# Patient Record
Sex: MALE | Race: WHITE | NOT HISPANIC OR LATINO | Employment: FULL TIME | URBAN - METROPOLITAN AREA
[De-identification: names, ages, dates, MRNs, and addresses within clinical notes are randomized per-mention and may not be internally consistent; named-entity substitution may affect disease eponyms.]

---

## 2017-04-05 ENCOUNTER — OFFICE VISIT - HEALTHEAST (OUTPATIENT)
Dept: INTERNAL MEDICINE | Facility: CLINIC | Age: 32
End: 2017-04-05

## 2017-04-05 ENCOUNTER — COMMUNICATION - HEALTHEAST (OUTPATIENT)
Dept: TELEHEALTH | Facility: CLINIC | Age: 32
End: 2017-04-05

## 2017-04-05 DIAGNOSIS — F34.1 DYSTHYMIA: ICD-10-CM

## 2017-04-05 DIAGNOSIS — M25.811 SHOULDER IMPINGEMENT, RIGHT: ICD-10-CM

## 2017-04-05 DIAGNOSIS — Z13.220 SCREENING FOR HYPERLIPIDEMIA: ICD-10-CM

## 2017-04-05 DIAGNOSIS — E66.9 OBESITY: ICD-10-CM

## 2017-04-05 DIAGNOSIS — Z00.00 ROUTINE GENERAL MEDICAL EXAMINATION AT A HEALTH CARE FACILITY: ICD-10-CM

## 2017-04-05 DIAGNOSIS — T78.40XA ALLERGY, INITIAL ENCOUNTER: ICD-10-CM

## 2017-04-05 DIAGNOSIS — Z13.1 SCREENING FOR DIABETES MELLITUS: ICD-10-CM

## 2017-04-05 DIAGNOSIS — Z30.2 ENCOUNTER FOR STERILIZATION: ICD-10-CM

## 2017-04-05 DIAGNOSIS — M25.562 LEFT KNEE PAIN: ICD-10-CM

## 2017-04-05 LAB
CHOLEST SERPL-MCNC: 220 MG/DL
FASTING STATUS PATIENT QL REPORTED: YES
HBA1C MFR BLD: 5.6 % (ref 3.5–6)
HDLC SERPL-MCNC: 34 MG/DL
LDLC SERPL CALC-MCNC: 152 MG/DL
TRIGL SERPL-MCNC: 168 MG/DL

## 2017-04-05 ASSESSMENT — MIFFLIN-ST. JEOR: SCORE: 2446.06

## 2017-04-07 ENCOUNTER — COMMUNICATION - HEALTHEAST (OUTPATIENT)
Dept: INTERNAL MEDICINE | Facility: CLINIC | Age: 32
End: 2017-04-07

## 2017-04-10 ENCOUNTER — OFFICE VISIT - HEALTHEAST (OUTPATIENT)
Dept: PHYSICAL THERAPY | Facility: REHABILITATION | Age: 32
End: 2017-04-10

## 2017-04-10 ENCOUNTER — OFFICE VISIT - HEALTHEAST (OUTPATIENT)
Dept: ALLERGY | Facility: CLINIC | Age: 32
End: 2017-04-10

## 2017-04-10 DIAGNOSIS — G89.29 CHRONIC PAIN OF LEFT KNEE: ICD-10-CM

## 2017-04-10 DIAGNOSIS — M25.811 SHOULDER IMPINGEMENT, RIGHT: ICD-10-CM

## 2017-04-10 DIAGNOSIS — M62.81 GENERALIZED MUSCLE WEAKNESS: ICD-10-CM

## 2017-04-10 DIAGNOSIS — M25.511 ACUTE PAIN OF RIGHT SHOULDER: ICD-10-CM

## 2017-04-10 DIAGNOSIS — M25.611 SHOULDER STIFFNESS, RIGHT: ICD-10-CM

## 2017-04-10 DIAGNOSIS — M25.562 CHRONIC PAIN OF LEFT KNEE: ICD-10-CM

## 2017-04-10 DIAGNOSIS — K20.0 EOSINOPHILIC ESOPHAGITIS: ICD-10-CM

## 2017-04-10 DIAGNOSIS — Z91.018 FOOD ALLERGY: ICD-10-CM

## 2017-04-19 ENCOUNTER — OFFICE VISIT - HEALTHEAST (OUTPATIENT)
Dept: INTERNAL MEDICINE | Facility: CLINIC | Age: 32
End: 2017-04-19

## 2017-04-19 DIAGNOSIS — R74.01 TRANSAMINITIS: ICD-10-CM

## 2017-04-19 DIAGNOSIS — Z88.9 MULTIPLE ALLERGIES: ICD-10-CM

## 2017-04-19 DIAGNOSIS — E55.9 VITAMIN D DEFICIENCY: ICD-10-CM

## 2017-04-19 DIAGNOSIS — F41.9 ANXIETY: ICD-10-CM

## 2017-04-19 ASSESSMENT — MIFFLIN-ST. JEOR: SCORE: 2440.39

## 2017-05-01 ENCOUNTER — OFFICE VISIT - HEALTHEAST (OUTPATIENT)
Dept: BEHAVIORAL HEALTH | Facility: CLINIC | Age: 32
End: 2017-05-01

## 2017-05-01 DIAGNOSIS — F33.1 MODERATE EPISODE OF RECURRENT MAJOR DEPRESSIVE DISORDER (H): ICD-10-CM

## 2017-05-10 ENCOUNTER — COMMUNICATION - HEALTHEAST (OUTPATIENT)
Dept: INTERNAL MEDICINE | Facility: CLINIC | Age: 32
End: 2017-05-10

## 2017-05-15 ENCOUNTER — OFFICE VISIT - HEALTHEAST (OUTPATIENT)
Dept: SURGERY | Facility: CLINIC | Age: 32
End: 2017-05-15

## 2017-05-15 DIAGNOSIS — F32.A DEPRESSION: ICD-10-CM

## 2017-05-15 DIAGNOSIS — Z87.898 HISTORY OF SNORING: ICD-10-CM

## 2017-05-15 DIAGNOSIS — R53.83 FATIGUE: ICD-10-CM

## 2017-05-15 DIAGNOSIS — Z87.442 PERSONAL HISTORY OF KIDNEY STONES: ICD-10-CM

## 2017-05-15 DIAGNOSIS — E66.01 OBESITY, CLASS III, BMI 40-49.9 (MORBID OBESITY) (H): ICD-10-CM

## 2017-05-15 ASSESSMENT — MIFFLIN-ST. JEOR: SCORE: 2409.77

## 2017-05-16 ENCOUNTER — AMBULATORY - HEALTHEAST (OUTPATIENT)
Dept: BEHAVIORAL HEALTH | Facility: CLINIC | Age: 32
End: 2017-05-16

## 2017-05-16 ENCOUNTER — OFFICE VISIT - HEALTHEAST (OUTPATIENT)
Dept: BEHAVIORAL HEALTH | Facility: CLINIC | Age: 32
End: 2017-05-16

## 2017-05-16 DIAGNOSIS — F33.1 MAJOR DEPRESSIVE DISORDER, RECURRENT EPISODE, MODERATE (H): ICD-10-CM

## 2017-05-16 DIAGNOSIS — F43.21 GRIEF: ICD-10-CM

## 2017-05-24 ENCOUNTER — AMBULATORY - HEALTHEAST (OUTPATIENT)
Dept: SLEEP MEDICINE | Facility: CLINIC | Age: 32
End: 2017-05-24

## 2017-05-25 ENCOUNTER — OFFICE VISIT - HEALTHEAST (OUTPATIENT)
Dept: BEHAVIORAL HEALTH | Facility: CLINIC | Age: 32
End: 2017-05-25

## 2017-05-25 DIAGNOSIS — F33.1 MAJOR DEPRESSIVE DISORDER, RECURRENT EPISODE, MODERATE (H): ICD-10-CM

## 2017-05-26 ENCOUNTER — OFFICE VISIT - HEALTHEAST (OUTPATIENT)
Dept: BEHAVIORAL HEALTH | Facility: CLINIC | Age: 32
End: 2017-05-26

## 2017-05-26 DIAGNOSIS — F33.1 MDD (MAJOR DEPRESSIVE DISORDER), RECURRENT EPISODE, MODERATE (H): ICD-10-CM

## 2017-05-26 DIAGNOSIS — G47.00 INSOMNIA, UNSPECIFIED: ICD-10-CM

## 2017-05-26 ASSESSMENT — MIFFLIN-ST. JEOR: SCORE: 2414.31

## 2017-06-19 ENCOUNTER — OFFICE VISIT - HEALTHEAST (OUTPATIENT)
Dept: BEHAVIORAL HEALTH | Facility: CLINIC | Age: 32
End: 2017-06-19

## 2017-06-19 DIAGNOSIS — F33.1 MAJOR DEPRESSIVE DISORDER, RECURRENT EPISODE, MODERATE (H): ICD-10-CM

## 2017-06-22 ENCOUNTER — RECORDS - HEALTHEAST (OUTPATIENT)
Dept: ADMINISTRATIVE | Facility: OTHER | Age: 32
End: 2017-06-22

## 2017-06-22 ENCOUNTER — RECORDS - HEALTHEAST (OUTPATIENT)
Dept: SLEEP MEDICINE | Facility: CLINIC | Age: 32
End: 2017-06-22

## 2017-06-22 DIAGNOSIS — E66.01 MORBID (SEVERE) OBESITY DUE TO EXCESS CALORIES (H): ICD-10-CM

## 2017-06-22 DIAGNOSIS — Z87.898 PERSONAL HISTORY OF OTHER SPECIFIED CONDITIONS: ICD-10-CM

## 2017-06-22 DIAGNOSIS — R53.83 OTHER FATIGUE: ICD-10-CM

## 2017-06-23 ENCOUNTER — OFFICE VISIT - HEALTHEAST (OUTPATIENT)
Dept: SURGERY | Facility: CLINIC | Age: 32
End: 2017-06-23

## 2017-06-23 DIAGNOSIS — E66.01 OBESITY, MORBID, BMI 40.0-49.9 (H): ICD-10-CM

## 2017-06-23 DIAGNOSIS — Z71.3 NUTRITIONAL COUNSELING: ICD-10-CM

## 2017-06-23 ASSESSMENT — MIFFLIN-ST. JEOR: SCORE: 2412.04

## 2017-06-27 ENCOUNTER — COMMUNICATION - HEALTHEAST (OUTPATIENT)
Dept: SLEEP MEDICINE | Facility: CLINIC | Age: 32
End: 2017-06-27

## 2017-06-27 ENCOUNTER — COMMUNICATION - HEALTHEAST (OUTPATIENT)
Dept: SURGERY | Facility: CLINIC | Age: 32
End: 2017-06-27

## 2017-07-06 ENCOUNTER — COMMUNICATION - HEALTHEAST (OUTPATIENT)
Dept: ALLERGY | Facility: CLINIC | Age: 32
End: 2017-07-06

## 2017-07-10 ENCOUNTER — COMMUNICATION - HEALTHEAST (OUTPATIENT)
Dept: BEHAVIORAL HEALTH | Facility: CLINIC | Age: 32
End: 2017-07-10

## 2017-08-16 ENCOUNTER — OFFICE VISIT - HEALTHEAST (OUTPATIENT)
Dept: SLEEP MEDICINE | Facility: CLINIC | Age: 32
End: 2017-08-16

## 2017-08-16 DIAGNOSIS — G47.33 OSA (OBSTRUCTIVE SLEEP APNEA): ICD-10-CM

## 2017-08-16 ASSESSMENT — MIFFLIN-ST. JEOR: SCORE: 2382.56

## 2017-08-25 ENCOUNTER — AMBULATORY - HEALTHEAST (OUTPATIENT)
Dept: SLEEP MEDICINE | Facility: CLINIC | Age: 32
End: 2017-08-25

## 2018-01-05 ENCOUNTER — OFFICE VISIT - HEALTHEAST (OUTPATIENT)
Dept: INTERNAL MEDICINE | Facility: CLINIC | Age: 33
End: 2018-01-05

## 2018-01-05 DIAGNOSIS — G47.33 OSA (OBSTRUCTIVE SLEEP APNEA): ICD-10-CM

## 2018-01-05 DIAGNOSIS — E66.01 OBESITY, CLASS III, BMI 40-49.9 (MORBID OBESITY) (H): ICD-10-CM

## 2018-01-05 DIAGNOSIS — F33.1 MAJOR DEPRESSIVE DISORDER, RECURRENT EPISODE, MODERATE (H): ICD-10-CM

## 2018-01-05 DIAGNOSIS — J11.1 INFLUENZA-LIKE ILLNESS: ICD-10-CM

## 2018-01-05 ASSESSMENT — MIFFLIN-ST. JEOR: SCORE: 2364.41

## 2018-03-12 ENCOUNTER — OFFICE VISIT - HEALTHEAST (OUTPATIENT)
Dept: INTERNAL MEDICINE | Facility: CLINIC | Age: 33
End: 2018-03-12

## 2018-03-12 DIAGNOSIS — J06.9 UPPER RESPIRATORY INFECTION: ICD-10-CM

## 2018-03-12 ASSESSMENT — MIFFLIN-ST. JEOR: SCORE: 2378.02

## 2018-08-15 ENCOUNTER — COMMUNICATION - HEALTHEAST (OUTPATIENT)
Dept: BEHAVIORAL HEALTH | Facility: CLINIC | Age: 33
End: 2018-08-15

## 2018-08-15 DIAGNOSIS — F33.1 MDD (MAJOR DEPRESSIVE DISORDER), RECURRENT EPISODE, MODERATE (H): ICD-10-CM

## 2019-05-03 ENCOUNTER — OFFICE VISIT - HEALTHEAST (OUTPATIENT)
Dept: INTERNAL MEDICINE | Facility: CLINIC | Age: 34
End: 2019-05-03

## 2019-05-03 DIAGNOSIS — Z91.09 ENVIRONMENTAL ALLERGIES: ICD-10-CM

## 2019-05-03 DIAGNOSIS — M21.42 FLAT FEET: ICD-10-CM

## 2019-05-03 DIAGNOSIS — E66.01 OBESITY, CLASS III, BMI 40-49.9 (MORBID OBESITY) (H): ICD-10-CM

## 2019-05-03 DIAGNOSIS — M21.41 FLAT FEET: ICD-10-CM

## 2019-05-03 DIAGNOSIS — K20.0 EOSINOPHILIC ESOPHAGITIS: ICD-10-CM

## 2019-05-03 DIAGNOSIS — F33.1 MDD (MAJOR DEPRESSIVE DISORDER), RECURRENT EPISODE, MODERATE (H): ICD-10-CM

## 2019-05-03 ASSESSMENT — MIFFLIN-ST. JEOR: SCORE: 2432.45

## 2019-05-07 ENCOUNTER — RECORDS - HEALTHEAST (OUTPATIENT)
Dept: ADMINISTRATIVE | Facility: OTHER | Age: 34
End: 2019-05-07

## 2019-05-16 ENCOUNTER — OFFICE VISIT - HEALTHEAST (OUTPATIENT)
Dept: PODIATRY | Facility: CLINIC | Age: 34
End: 2019-05-16

## 2019-05-16 DIAGNOSIS — M21.6X2 PRONATION DEFORMITY OF BOTH FEET: ICD-10-CM

## 2019-05-16 DIAGNOSIS — M62.40 CONTRACTED, TENDON: ICD-10-CM

## 2019-05-16 DIAGNOSIS — M21.6X1 PRONATION DEFORMITY OF BOTH FEET: ICD-10-CM

## 2019-05-16 ASSESSMENT — MIFFLIN-ST. JEOR: SCORE: 2432.45

## 2019-05-23 ENCOUNTER — RECORDS - HEALTHEAST (OUTPATIENT)
Dept: ADMINISTRATIVE | Facility: OTHER | Age: 34
End: 2019-05-23

## 2019-05-23 ENCOUNTER — AMBULATORY - HEALTHEAST (OUTPATIENT)
Dept: OTHER | Facility: CLINIC | Age: 34
End: 2019-05-23

## 2019-05-29 ENCOUNTER — RECORDS - HEALTHEAST (OUTPATIENT)
Dept: ADMINISTRATIVE | Facility: OTHER | Age: 34
End: 2019-05-29

## 2019-06-06 ENCOUNTER — AMBULATORY - HEALTHEAST (OUTPATIENT)
Dept: OTHER | Facility: CLINIC | Age: 34
End: 2019-06-06

## 2019-06-10 ENCOUNTER — COMMUNICATION - HEALTHEAST (OUTPATIENT)
Dept: SCHEDULING | Facility: CLINIC | Age: 34
End: 2019-06-10

## 2019-06-28 ENCOUNTER — RECORDS - HEALTHEAST (OUTPATIENT)
Dept: ADMINISTRATIVE | Facility: OTHER | Age: 34
End: 2019-06-28

## 2019-07-01 ENCOUNTER — RECORDS - HEALTHEAST (OUTPATIENT)
Dept: ADMINISTRATIVE | Facility: OTHER | Age: 34
End: 2019-07-01

## 2019-08-06 ENCOUNTER — RECORDS - HEALTHEAST (OUTPATIENT)
Dept: ADMINISTRATIVE | Facility: OTHER | Age: 34
End: 2019-08-06

## 2019-08-30 ENCOUNTER — RECORDS - HEALTHEAST (OUTPATIENT)
Dept: ADMINISTRATIVE | Facility: OTHER | Age: 34
End: 2019-08-30

## 2019-09-16 ENCOUNTER — RECORDS - HEALTHEAST (OUTPATIENT)
Dept: ADMINISTRATIVE | Facility: OTHER | Age: 34
End: 2019-09-16

## 2019-10-04 ENCOUNTER — HEALTH MAINTENANCE LETTER (OUTPATIENT)
Age: 34
End: 2019-10-04

## 2019-11-18 ENCOUNTER — RECORDS - HEALTHEAST (OUTPATIENT)
Dept: ADMINISTRATIVE | Facility: OTHER | Age: 34
End: 2019-11-18

## 2019-11-21 ENCOUNTER — RECORDS - HEALTHEAST (OUTPATIENT)
Dept: ADMINISTRATIVE | Facility: OTHER | Age: 34
End: 2019-11-21

## 2020-05-14 ENCOUNTER — COMMUNICATION - HEALTHEAST (OUTPATIENT)
Dept: INTERNAL MEDICINE | Facility: CLINIC | Age: 35
End: 2020-05-14

## 2020-05-14 DIAGNOSIS — F33.1 MDD (MAJOR DEPRESSIVE DISORDER), RECURRENT EPISODE, MODERATE (H): ICD-10-CM

## 2020-11-08 ENCOUNTER — HEALTH MAINTENANCE LETTER (OUTPATIENT)
Age: 35
End: 2020-11-08

## 2021-03-17 ENCOUNTER — COMMUNICATION - HEALTHEAST (OUTPATIENT)
Dept: INTERNAL MEDICINE | Facility: CLINIC | Age: 36
End: 2021-03-17

## 2021-03-17 DIAGNOSIS — F33.1 MDD (MAJOR DEPRESSIVE DISORDER), RECURRENT EPISODE, MODERATE (H): ICD-10-CM

## 2021-05-28 NOTE — PATIENT INSTRUCTIONS - HE
Please call one of the [place] locations below to schedule an appointment. If you received a prescription please bring it with you to your appointment. Some locations are limited to what they carry.    Office Locations    [facility]  [address] [place] [address]  Home Medical Equipment, [address]   Phone: [phone]   Orthotics and Prosthetics, [address]   Phone: [phone]    [facility]  Home Medical Equipment  [address], [address], [place] [address]   Phone: [phone]    Orthotics and Prosthetics ([facility])    [address], [address], [place] [address]  Phone: [phone]    [facility]  [address]  [address]   [place] [address]   Phone: [phone]    [facility]  [address]  [place] [address]  Phone: [phone]    [facility]   [address]  [place] [address]  Phone: [phone]    [facility]  [address]  [place] [address]  Phone: [phone]    [facility]  [address]  [address]  [place] [address]  Phone: [phone]    [place]   [address] [place] [address]   Phone: [phone]

## 2021-05-28 NOTE — PROGRESS NOTES
FOOT AND ANKLE SURGERY/PODIATRY CONSULT NOTE         ASSESSMENT:   Pronation deformity bilateral feet  Contracted Achilles tendon bilaterally      TREATMENT:  I have recommended new orthotics        HPI: I was asked to see Ronnie CRUZ GarnicaHancock today to evaluate and treat bilateral foot pain.  Patient stated that he has a history of wearing orthotics.  He has not had a new pair of orthotics in 4 years.  He stated he has tried to remain active.  He is finding it difficult to wear current orthotics while exercising because they do not give him the control he had initially.  Denies any trauma to his feet.  He has not had any redness or swelling.  Pain is mild to moderate.  It is relieved with nonweightbearing.  The patient was seen in consultation at the request of Simon Major MD for evaluation treatment painful flatfeet.     Past Medical History:   Diagnosis Date     Cholelithiasis      Depression     on bupropion, doing well now. therapy as well      Environmental allergies     Cat dander, dust mites, pine nuts, ragweed.     Eosinophilic esophagitis     EGD in 2015     Kidney stones        Past Surgical History:   Procedure Laterality Date     CHOLECYSTECTOMY       CYSTOSCOPY W/ URETEROSCOPY W/ LITHOTRIPSY Left        No Known Allergies      Current Outpatient Medications:      buPROPion (WELLBUTRIN XL) 150 MG 24 hr tablet, TAKE 1 TABLET BY MOUTH EVERY MORNING, Disp: 90 tablet, Rfl: 3     fluticasone propionate (FLOVENT HFA) 220 mcg/actuation inhaler, 2 puffs swallowed twice daily, Disp: 1 Inhaler, Rfl: 11     loratadine (CLARITIN) 10 mg tablet, Take 1 tablet (10 mg total) by mouth daily., Disp: 90 tablet, Rfl: 3     omeprazole (PRILOSEC) 20 MG capsule, Take 1 capsule (20 mg total) by mouth daily., Disp: 90 capsule, Rfl: 3    Family History   Problem Relation Age of Onset     Colon cancer Mother 50     Obesity Mother      Arthritis Mother      Hyperlipidemia Mother      Sleep apnea Mother      Snoring Mother      Kidney  cancer Father      Cancer Father      No Medical Problems Sister      Cholesteatoma Brother      Sleep apnea Brother      Hyperlipidemia Maternal Uncle      Hypertension Maternal Uncle      Obesity Maternal Grandmother      Hyperlipidemia Maternal Grandmother      Hypertension Maternal Grandmother      Asthma Maternal Grandmother        Social History     Socioeconomic History     Marital status: Single     Spouse name: Not on file     Number of children: Not on file     Years of education: Not on file     Highest education level: Not on file   Occupational History     Not on file   Social Needs     Financial resource strain: Not on file     Food insecurity:     Worry: Not on file     Inability: Not on file     Transportation needs:     Medical: Not on file     Non-medical: Not on file   Tobacco Use     Smoking status: Passive Smoke Exposure - Never Smoker     Smokeless tobacco: Never Used   Substance and Sexual Activity     Alcohol use: No     Drug use: No     Sexual activity: Yes     Partners: Female   Lifestyle     Physical activity:     Days per week: Not on file     Minutes per session: Not on file     Stress: Not on file   Relationships     Social connections:     Talks on phone: Not on file     Gets together: Not on file     Attends [employer]al service: Not on file     Active member of club or organization: Not on file     Attends meetings of clubs or organizations: Not on file     Relationship status: Not on file     Intimate partner violence:     Fear of current or ex partner: Not on file     Emotionally abused: Not on file     Physically abused: Not on file     Forced sexual activity: Not on file   Other Topics Concern     Not on file   Social History Narrative    Lives with his Andie nava.  Works at [employer].  HS graduate some college.         Review of Systems - Patient denies fever, chills, rash, wound, stiffness, limping, numbness, weakness, heart burn, blood in stool, chest pain with  activity, calf pain when walking, shortness of breath with activity, chronic cough, easy bleeding/bruising, swelling of ankles, excessive thirst, fatigue, depression, anxiety.  Patient admits to bilateral foot pain.      OBJECTIVE:  Appearance: alert, well appearing, and in no distress.    Vitals:    05/16/19 1257   BP: 104/70   Pulse: 94   SpO2: 93%       BMI= Body mass index is 46.26 kg/m .    General appearance: Patient is alert and fully cooperative with history & exam.  No sign of distress is noted during the visit.  Psychiatric: Affect is pleasant & appropriate.  Patient appears motivated to improve health.  Respiratory: Breathing is regular & unlabored while sitting.  HEENT: Hearing is intact to spoken word.  Speech is clear.  No gross evidence of visual impairment that would impact ambulation.    Vascular: Dorsalis pedis and posterior tibial pulses are palpable. There is pedal hair growth bilaterally.  CFT < 3 sec from anterior tibial surface to distal digits bilaterally. There is no appreciable edema noted.  Dermatologic: Turgor and texture are within normal limits. No coloration or temperature changes. No primary or secondary lesions noted.  Neurologic: All epicritic and proprioceptive sensations are grossly intact bilaterally.  Musculoskeletal: All active and passive ankle, subtalar, midtarsal, and 1st MPJ range of motion are grossly intact without pain or crepitus, with the exception of none.  Is flattening of the medial longitudinal arch noted bilaterally upon weightbearing.  There is a significant decrease in the amount of dorsiflexion available at both ankle joints when both feet are maximally dorsiflexed with the legs are extended.  Manual muscle strength is +4/5 bilaterally in all compartments. All dorsiflexors, plantarflexors, invertors, evertors are intact bilaterally.  No tenderness present to bilateral feet or ankles on palpation.  No tenderness to bilateral feet or ankles with range of motion.  Calf is soft/non-tender without warmth/induration    Imaging:     No results found.       TREATMENT:  I have recommended orthotics.  The patient is to return to the clinic as needed.    Dejan Santos; JAMIE  [facility] Foot & Ankle Surgery/Podiatry

## 2021-05-29 NOTE — PROGRESS NOTES
"S: Pt. seen in the [place] office. Male 5'11\", 321 lbs. Dr. Santos. RX: Custom Foot Orthotics. DX: Pronation deformity of both feet, Contracted tendon.  O: Pt. is presently wearing 3/4 length FO that he has had for 4 years. No surgeries or injuries to date.  A: G: Pt. ambulates without assistance. ROM within norm. Arch pain 3/10. Pes Cavus feet. Hind foot valgus. Fore foot neutral. 1st ray flexible. Bilateral pinch callus's and callus formation bilaterally on 2 and 5 MTH's plantar surface. Today I C/M with bio foam for Custom [place] FO. FO to lift and support medial long. arches. Straighten ankle valgus. Met pads to offload MTH's. Due to weight and Pes Cavus feet Pt. will need custom.  P: Pt. to be seen for F/D.    Marcial LORA,LO  "

## 2021-05-29 NOTE — PROGRESS NOTES
"S: Pt. seen in the [place] office for F/D of [place] FO. Male 5'11\", 321 lbs. Dr. Santos. RX: Custom Foot Orthotics. DX: Pronation deformity of both feet, Contracted tendon.  O: Pt. is presently wearing 3/4 length FO that he has had for 4 years. No surgeries or injuries to date.  A: G: Pt. ambulates without assistance. ROM within norm. Arch pain 3/10. Pes Cavus feet. Hind foot valgus. Fore foot neutral. 1st ray flexible. Bilateral pinch callus's and callus formation bilaterally on 2 and 5 MTH's plantar surface. Today I F/D Custom [place] FO. FO lift and support medial long. arches. Straighten ankle valgus. Met pads offload MTH's. Due to weight and Pes Cavus feet Pt. will need custom. Overall fit is good. Donning doffing wear and care along with break in schedule given.   P: Pt. to be seen as needed..    Marcial LORA,LO  "

## 2021-05-29 NOTE — TELEPHONE ENCOUNTER
"RN Triage:   Patient is calling to set up an appointment for \"random chest pains on the left side\". Happens when he is up walking around and no chest pains at rest. Patient stated it feels like a \"hurt muscle\" to the left of his breast bone. Heart rate is normally at 90-95.   Patient stated that his chest pain does not radiate. Patient reported that his pain is made worse by walking and relieved by rest. 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 Patient was given the disposition of ED now. Patient agreed to plan and will go now.     Margarette Santos RN, BSN Care Connection Triage Nurse    Reason for Disposition    Chest pain lasting longer than 5 minutes    Intermittent chest pain and pain has been increasing in severity or frequency    Answer Assessment - Initial Assessment Questions  1. LOCATION: \"Where does it hurt?\"        Left of the breast bone.   2. RADIATION: \"Does the pain go anywhere else?\" (e.g., into neck, jaw, arms, back)      NO  3. ONSET: \"When did the chest pain begin?\" (Minutes, hours or days)       Started Friday late afternoon  4. PATTERN \"Does the pain come and go, or has it been constant since it started?\"  \"Does it get worse with exertion?\"       Comes and goes when he is walking  5. DURATION: \"How long does it last\" (e.g., seconds, minutes, hours)      The whole time he is up walking, he does not walk often.   6. SEVERITY: \"How bad is the pain?\"  (e.g., Scale 1-10; mild, moderate, or severe)    He needs to sit after he is up walking.   7. CARDIAC RISK FACTORS: \"Do you have any history of heart problems or risk factors for heart disease?\" (e.g., prior heart attack, angina; high blood pressure, diabetes, being overweight, high cholesterol, smoking, or strong family history of heart disease)     Overwieght  8. PULMONARY RISK FACTORS: \"Do you have any history of lung disease?\"  (e.g., blood clots in lung, asthma, emphysema, birth control pills)      NO  9. " "CAUSE: \"What do you think is causing the chest pain?\"      Unknown  10. OTHER SYMPTOMS: \"Do you have any other symptoms?\" (e.g., dizziness, nausea, vomiting, sweating, fever, difficulty breathing, cough)  NO    Protocols used: CHEST PAIN-A-OH      "

## 2021-05-30 VITALS — WEIGHT: 315 LBS | HEIGHT: 71 IN | BODY MASS INDEX: 44.1 KG/M2

## 2021-05-30 VITALS — BODY MASS INDEX: 44.1 KG/M2 | HEIGHT: 71 IN | WEIGHT: 315 LBS

## 2021-05-31 VITALS — WEIGHT: 312 LBS | BODY MASS INDEX: 43.68 KG/M2 | HEIGHT: 71 IN

## 2021-05-31 VITALS — WEIGHT: 315 LBS | BODY MASS INDEX: 44.1 KG/M2 | HEIGHT: 71 IN

## 2021-05-31 VITALS — BODY MASS INDEX: 44.1 KG/M2 | WEIGHT: 315 LBS | HEIGHT: 71 IN

## 2021-06-01 VITALS — WEIGHT: 315 LBS | BODY MASS INDEX: 44.1 KG/M2 | HEIGHT: 71 IN

## 2021-06-03 VITALS — WEIGHT: 315 LBS | BODY MASS INDEX: 44.1 KG/M2 | HEIGHT: 71 IN

## 2021-06-08 NOTE — TELEPHONE ENCOUNTER
RN cannot approve Refill Request    RN can NOT refill this medication PCP messaged that patient is overdue for Office Visit. Last office visit: 5/3/2019 Simon Major MD Last Physical: 4/5/2017 Last MTM visit: Visit date not found Last visit same specialty: 5/3/2019 Simon Major MD.  Next visit within 3 mo: Visit date not found  Next physical within 3 mo: Visit date not found      Tammy Haddad, [facility] Triage/Med Refill 5/14/2020    Requested Prescriptions   Pending Prescriptions Disp Refills     buPROPion (WELLBUTRIN XL) 150 MG 24 hr tablet [Pharmacy Med Name: BUPROPION HCL  MG TABLET] 90 tablet 3     Sig: TAKE ONE TABLET BY MOUTH ONCE DAILY EVERY MORNING       Tricyclics/Misc Antidepressant/Antianxiety Meds Refill Protocol Failed - 5/14/2020 12:36 AM        Failed - PCP or prescribing provider visit in last year     Last office visit with prescriber/PCP: 5/3/2019 Simon Major MD OR same dept: Visit date not found OR same specialty: 5/3/2019 Simon Major MD  Last physical: 4/5/2017 Last MTM visit: Visit date not found   Next visit within 3 mo: Visit date not found  Next physical within 3 mo: Visit date not found  Prescriber OR PCP: Simon Major MD  Last diagnosis associated with med order: 1. MDD (major depressive disorder), recurrent episode, moderate (H)  - buPROPion (WELLBUTRIN XL) 150 MG 24 hr tablet [Pharmacy Med Name: BUPROPION HCL  MG TABLET]; TAKE ONE TABLET BY MOUTH ONCE DAILY EVERY MORNING  Dispense: 90 tablet; Refill: 3    If protocol passes may refill for 12 months if within 3 months of last provider visit (or a total of 15 months).

## 2021-06-09 NOTE — PROGRESS NOTES
Office Visit - Physical   Ronnie Hancock   32 y.o.  male    Date of visit: 4/5/2017  Physician: Simon Major MD     Assessment and Plan   1. Routine general medical examination at a health care facility  This is a 32-year-old man who could benefit from weight loss.  We discussed at length.  We discussed the importance of reduction in calories.  We also discussed importance of fitness.  Immunizations are up-to-date.  He was given advanced directive paperwork.    2. Screening for hyperlipidemia  - Lipid Cascade    3. Screening for diabetes mellitus  - Glycosylated Hemoglobin A1c    4. Left knee pain  I think he has some patellar tendinopathy and I discussed quad strengthening and hamstring stretching with him  - Ambulatory referral to Physical Therapy    5. Shoulder impingement, right  Discussed Codman maneuvers  - Ambulatory referral to Physical Therapy    6. Encounter for sterilization  - Ambulatory referral to Urology    7. Dysthymia  Start Wellbutrin 150 mg daily, referral for therapy, follow-up in 2 weeks  - Ambulatory referral to Psychology    8. Allergy, initial encounter  - Ambulatory referral to Allergy    9. Obesity  - Ambulatory referral to Bariatric Care: Surgical and Non-Surgical  - HM2(CBC w/o Differential)  - Comprehensive Metabolic Panel  - Thyroid Cascade  - Vitamin D, Total (25-Hydroxy)      The following high BMI interventions were performed this visit: encouragement to exercise and lifestyle education regarding diet    Return in about 2 weeks (around 4/19/2017) for recheck.     Chief Complaint   Chief Complaint   Patient presents with     Annual Exam     New Pt Physical - Fasting for lab work - Left knee pain, stairs difficult, ROM decreased - Right tricep pain x months, NKI - Discuss weight issues - discuss allergy testing - Discuss Vasectomy        Patient Profile   Social History     Social History Narrative    Lives with his Andie nava.  Works at [employer].  HS  graduate some college.          Past Medical History   Patient Active Problem List   Diagnosis     Obesity       Past Surgical History  He has a past surgical history that includes Cholecystectomy and Cystoscopy w/ ureteroscopy w/ lithotripsy (Left).     History of Present Illness   This 32 y.o. old man comes in for annual physical, to establish care and for evaluation of numerous medical complaints.  His medical history was reviewed, the electronic medical record was updated and it is reflected in this note.  The main issue would like to talk about his obesity.  His wife is also obese.  They are both interested in losing weight.  He has tried exercise but since starting exercise he has had left knee pain and right shoulder pain.  This limits his amount of exercise.  He does not really watch his calories.  He is interested in nonsurgical weight loss and would like to meet with a specialist.  He has knee pain is worse with going from sitting to standing climbing stairs going down stairs and squatting.  It is worse with activity and improves with rest.  His shoulder hurts with overhead motion.  No known injury to either.  Additionally we had a long discussion today about low mood.  He has had depression in the past and has been on antidepressants and since therapist.  He thought bupropion worked well.  He is not sure why he stopped it.  He has little motivation to do things and gets little enjoyment out of things.  Denies thoughts of self-harm or harm to others.  He is engaged to be  and neither he nor his wife are interested in children.  She has an IUD in the use condoms.  He is interested in surgical contraception.  He has issues with what sounds like allergic rhinosinusitis and cough.  He has had some allergy testing it sounds like blood work and he would like to meet with an allergist about food allergies.  It does not sound like he has ever had anaphylaxis.    Review of Systems: A comprehensive review of  "systems was negative except as noted.     Medications and Allergies   Current Outpatient Prescriptions   Medication Sig Dispense Refill     fexofenadine (ALLEGRA) 60 MG tablet Take 60 mg by mouth as needed.       buPROPion (WELLBUTRIN XL) 150 MG 24 hr tablet Take 1 tablet (150 mg total) by mouth every morning. 30 tablet 2     No current facility-administered medications for this visit.      No Known Allergies     Family and Social History   Family History   Problem Relation Age of Onset     Colon cancer Mother 50     Kidney cancer Father      No Medical Problems Sister      Cholesteatoma Brother         Social History   Substance Use Topics     Smoking status: Never Smoker     Smokeless tobacco: None     Alcohol use No        Physical Exam   General Appearance:   No acute distress    /78 (Patient Site: Left Arm, Patient Position: Sitting)  Pulse 72  Ht 5' 10.5\" (1.791 m)  Wt (!) 330 lb (149.7 kg)  SpO2 98%  BMI 46.68 kg/m2    EYES: Eyelids, conjunctiva, and sclera were normal. Pupils were normal. Cornea, iris, and lens were normal bilaterally.  HEAD, EARS, NOSE, MOUTH, AND THROAT: Head and face were normal. Hearing was normal to voice and the ears were normal to external exam. Nose appearance was normal and there was no discharge. Oropharynx was normal.  NECK: Neck appearance was normal. There were no neck masses and the thyroid was not enlarged.  RESPIRATORY: Breathing pattern was normal and the chest moved symmetrically.  Percussion/auscultatory percussion was normal.  Lung sounds were normal and there were no abnormal sounds.  CARDIOVASCULAR: Heart rate and rhythm were normal.  S1 and S2 were normal and there were no extra sounds or murmurs. Peripheral pulses in arms and legs were normal.  Jugular venous pressure was normal.  There was no peripheral edema.  GASTROINTESTINAL: The abdomen was obese in contour.  Bowel sounds were present.  Percussion detected no organ enlargement or tenderness.  Palpation " detected no tenderness, mass, or enlarged organs.   MUSCULOSKELETAL: Skeletal configuration was normal and muscle mass was normal for age. Joint appearance was overall normal.  Left shoulder with evidence of impingement, left knee seems structurally intact ligaments without abnormality, no effusion, good range of motion  LYMPHATIC: There were no enlarged nodes.  SKIN/HAIR/NAILS: Skin color was normal.  There were no skin lesions.  Hair and nails were normal.  NEUROLOGIC: The patient was alert and oriented to person, place, time, and circumstance. Speech was normal. Cranial nerves were normal. Motor strength was normal for age. The patient was normally coordinated.  PSYCHIATRIC:  Mood and affect were normal and the patient had normal recent and remote memory. The patient's judgment and insight were normal.       Additional Information        Simon Major MD  Internal Medicine  Contact me at [phone]

## 2021-06-10 NOTE — PROGRESS NOTES
Mental Health Visit Note    5/25/2017    Start time: 800    Stop Time: 855   Session # 3    Ronnie Hancock is a 32 y.o. male is being seen today for    Chief Complaint   Patient presents with     MH Follow Up     Depression     New symptoms or complaints: None    Functional Impairment:   Personal: 2  Family: 2  Work: 2  Social:3    Clinical assessment of mental status: The patient was on time for their scheduled session.  They were appropriately dressed with good grooming and hygiene.  The patient was oriented X3.  Patient was pleasant and cooperative.  Mood and affect were anxious and congruent with his speech.   The patient made appropriate eye contact.  Recent and remote memories were intact.  Thought process was logical and linear.  Speech/language (tone and rate) were normal.  Insight and judgment were adequate.    Suicidal/Homicidal Ideation present: None Reported This Session    Patient's impression of their current status: Patient indicates his need for psychotherapy is related to his lack of motivation and increased work and social difficulties.  He reports that he has begun taking time away from work as he doesn't feel like going in.  He is also concerned about interpersonal issues with his fiance.     Therapist impression of patients current state: This [facility]  Establishing a therapeutic rapport through exploring his problems utilizing an MI approach was the goal of this first session.  Patient's DA and Treatment Plan were reviewed prior to meeting with patient.  Patient agrees to meet again.  He has had success with psychotherapy in the past, by his report, and I would suspect he would do well again.  A combination of CBT and CPT appear to be appropriate for this patient's anxiety, depression and trauma history.      Type of psychotherapeutic technique provided: Client centered and MI    Progress toward short term goals:Progress as  expected, as evidenced by the patient's attendance to this transfer of care session.    Review of long term goals: Not done at today's visit    Diagnosis:   1. Major depressive disorder, recurrent episode, moderate        Plan and Follow up: 1.  Patient to schedule for continued follow up psychotherapy appointments.    2.  Patient will use their crisis plan and/or access crisis services should symptoms       become worse.      3.  Patient to remain medication compliant.  4.  Patient to abstain from drugs and alcohol.      Discharge Criteria/Planning: Patient will continue with follow-up until therapy can be discontinued without return of signs and symptoms.    Osvaldo Finch 5/25/2017      This note was created with help of Dragon dictation software. Grammatical / typing errors are not intentional and inherent to the software.

## 2021-06-10 NOTE — PROGRESS NOTES
Assessment:    Allergic rhinitis with multiple sensitivities including dust mite, mold, cat, cockroach, tree, grass and weed pollen  Eosinophilic esophagitis.    Minamally positive test to tree nuts.  Possibly related to symptoms    Plan:    Recommend medication management including omeprazole 20 mg daily.    I recommend Flovent 22 0-2 puffs swallowed twice daily with active flares.    Consider allergy shots.  Information given  ___________________________________________________________    Ronnie comes in today for evaluation of allergies.  He is concerned about possible food allergies.  Several years ago he started having difficulty with swallowing.  Reports thick feeling of mucus in the back of his throat.  Sometimes this difficulty swallowing is associated with chest pain and vomiting.  A year ago he had endoscopy done which showed eosinophilic esophagitis.  He had strictures in his esophagus was stretched.  This did help a bit but he notices he is continuing to have symptoms and they seem to be getting worse.  He is now having symptoms approximately once a week.  In the past he was on Flovent swallowed for 1 month without much benefit.  He has not been on antacids.  He does have a history of environmental allergies including itchy watery eyes rhinorrhea nasal congestion and sneezing.  This is year-round without any clear trigger.  Regarding his swallowing difficulties there is no clear food trigger.  There is no seasonality noted.  Ronnie did have an allergy evaluation including allergy testing done.  He had multiple positive tests including dust mite, Alternaria, cat, cockroach, multiple tree pollens, ragweed and grass pollen.  This was done as specific IgE blood testing.  This was done by an allergist.  No food allergy testing done.    Review of symptoms:As above otherwise negative    Past medical history: No other chronic medical conditions noted.  History of cholecystectomy    Allergies: No known  allergies to medication, latex, hymenoptera venom or foods.    Family history: Multiple members with allergies.  No family history of eosinophilic esophagitis    Social history: Currently lives in the same apartment since December 2016.  No pets in the home.  No visible water seepage or mold in the home.  No cigarette smoking history.    Medications: Reviewed in chart    Physical Exam:  General:  Alert and oriented.  Eyes:  Sclera clear.  Ears: TMs translucent grey with bony landmarks visible. Nose: Pale, boggy mucosal membranes.  Throat: Pink, moist.  No lesions.  Neck: Supple.  No lymphadenopathy.  Lungs: CTA.  CV: Regular rate and rhythm. Extremities: Well perfused.  No clubbing or cyanosis. Skin: No rash    Last Food Skin Allergy Test Results  Major Allergens  Wheat  1:20 (W/F in mm): 0/0 (04/10/17 1546)  Soy 1:40 (W/F in mm): 0/0 (04/10/17 1546)  Milk, Cow  1:20 (W/F in mm): 0/0 (04/10/17 1546)  Egg (White)  1:20 (W/F in mm): 0/0 (04/10/17 1546)  Grains  Corn 1:40 (W/F in mm): 0/0 (04/10/17 1546)  Barley 1:20 (W/F in mm): 0/0 (04/10/17 1546)  Rye 1:20 (W/F in mm): 0/0 (04/10/17 1546)  Oat 1:20 (W/F in mm): 0/0 (04/10/17 1546)  Meat  Beef  1:20 (W/F in mm): 0/0 (04/10/17 1546)  Pork  1:20 (W/F in mm): 0/0 (04/10/17 1546)  Chicken  1:20 (W/F in mm): 0/0 (04/10/17 1546)  Vegetables-Solanum  White Potato  1:20 (W/F in mm): 0/0 (04/10/17 1546)  Plant Nuts  Peanut  1:20 (W/F in mm): 0/0 (04/10/17 1546)  Tree Nuts  Ruston  1:20 (W/F in mm): 0/0 (04/10/17 1546)  Pecan  1:20 (W/F in mm): 0/0 (04/10/17 1546)  Hazelnut (Filbert)  1:20 (W/F in mm): 4/F (04/10/17 1546)  Hunt  1:20 (W/F in mm): 6/F (04/10/17 1546)  Brazil Nut  1:20 (W/F in mm): 0/0 (04/10/17 1546)  Cashew  1:20 (W/F in mm): 0/0 (04/10/17 1546)  Pistachio  1:10 (W/F in mm): 0/0 (04/10/17 1546)  Controls  Device Type: QUINTIP (04/10/17 1546)  Neg. Control: 50% Glycerine-Saline H (W/F in millimeters): 0/0 (04/10/17 1546)  Pos. Control Histamine 6 mg/ml (W/F  in millimeters): 9/F (04/10/17 4796)     45 min spent in direct contact with the patient.  More than 50% in counseling and coordination of care.  This transcription uses voice recognition software, which may contain typographical errors.

## 2021-06-10 NOTE — PROGRESS NOTES
Patient here for consult regarding non-surgical weight loss. Initial labs ordered today and patient instructed to do as soon as possible.  Helped to set up future appointments.   Measurements taken today and photos declined .  Patient verbalized understanding at this time without any questions.      April Way RN, CBN  [facility]  P [phone]  F [phone]

## 2021-06-10 NOTE — PROGRESS NOTES
[facility]:  Non-Surgical Weight Loss Intake Appointment   Date of visit: 5/15/2017  Physician: Adam Galo MD  Primary Care is Simon Major MD.  Ronnie Hancock   32 y.o.  male  Ronnie is a 32 y.o. year old male who is here today for consultation regarding non-surgical weight loss.   he was referred to the [facility] by Dr. Major.    Weight History:   Wt Readings from Last 3 Encounters:   05/15/17 (!) 322 lb (146.1 kg)   04/19/17 (!) 328 lb 12 oz (149.1 kg)   04/05/17 (!) 330 lb (149.7 kg)     Body mass index is 45.55 kg/(m^2).       Assessment and Plan   Assessment: Ronnie Hancock is a 32 y.o.,male presenting for assistance with medical weight loss.  Identified issues contributing to his excess weight include:     He gained a lot of weight in his teens/early twenties going through family strife/depression and has been steady at his current weight the last 10 years or so due to relatively sedentary behavior ( and card/video/internet ).  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  He liked the structure of graduating to next levels and we discussed finding a similar joão in any number of class settings (dancing lessons with [employer]/martial arts/etc).     His sleep is quite restless and non restorative and given his snoring history a sleep study will help determine if undiagnosed sleep apnea or limb movement problems are further worsening his fatigue and the resultant effects on his food intake.      Given his BMI of 45 he does qualify for surgical weight loss and he can attend the surgical weight loss seminar should he become interested in this option for his weight loss if he's struggling with non surgical options.  He wasn't interested at this time.      Plan:  1.  Behavior Goals: 3 daily meals plan,  ideally with a large breakfast, medium lunch        and smaller dinner. Avoidance of sugared-sweetened beverages and processed        carbohydrate snacks.  Work towards pre-planning meals to avoid falling back into old             habits/obesogenic habits.  Daily Food Tracking and morning weight recommended.  Weekly       check-in through MyChart to increase compliance.    2.  Diet Goals: Recommend a 3099-5476 Calorie daily restriction.  Increased protein intake to insure at  least 25-30 grams of protein per meal.      3.  Exercise/Activity Goals: start couch to 5k routine on elliptical or exercise bike.  Long term goal of increasing endurance to allow for at least  200 minutes weekly of moderate exercise to maintain weight loss.      4.  Recommendation regarding weight loss medication:  He's already on bupropion which I prefer not to mix with phentermine due to risk of seizures/hypertension issues, will add trial of naltrexone for additional crave control and appetite suppressive effects and check efficacy and tolerability in about a month.    5.  Referral to Dietician for further education and customization of diet plan.    6.  Stress Reduction: doing well. Encouraged to find a shared activity with is tavia that gets them both active (she's desiring to lose weight as well).    7.  Intake Labs:  Reviewed 4/17 labs, low vitamin D, increased triglycerides, a1c of 5.6%, TSH 2.2.  Given hx of stones, will check PTH.    8.   Food journal instructions provided.    9. Hx of kidney stones and gallstones: he's hydrating much better that in the past and is on pace for adequate hydration. Will check PTH. Previous cholecystectomy.     10. Depression, following up with psychology and psychiatry, currently using bupropion without any untoward side effects and mood seems balanced today.      1. Personal history of kidney stones  Parathyroid Hormone Intact   2. Obesity, Class III, BMI 40-49.9 (morbid obesity)  naltrexone (DEPADE)  50 mg tablet    Amb Referral to Bariatric Dietician    Split Night Sleep Study   3. Fatigue  Split Night Sleep Study   4. History of snoring  Split Night Sleep Study   5. Depression            Return in about 4 weeks (around 6/12/2017).     Weight and Lifestyle History    Sleep history: Goes to bed around 10:30pm up to bathroom once and tosses and turns (some right shoulder pain/issues, previous PT). Gets up for the day at 7am to an alarm (similar on weekends, may sleep in an hour).  Breakfast is improving lately but formerly skipped on weekends and at work would have donut.  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  Break time is 10:45 to 11am mug of water.  Lunch is noon: cafeteria at [employer] and have the special (chicken/rice/black beams today), Afternoon break 2:45-3 water break. 4:45 quitting time and heads home. Dinner in [employer] on Monday during card game night.  Rest of the week is home cooked premade chicken breast with veggie side/potatoes 5:45 to 6pm. Friday is [employer] game night and frozen meal prepared there. After dinner, internet and games. No exercise.  Hours per night: interrupted 8.5 hrs  Snoring/apnea/insomnia? yes  Restful/refreshed? no  Shift work? no  CPAP/BiPAP? no  Sleep study previously? no  TV in bedroom? no  Sleep aids/pills? no      STOP-BANG score for sleep apnea :   For general population   NA - Low Risk : Yes to 0 - 2 questions  NA - Intermediate Risk : Yes to 3 - 4 questions  NA - High Risk : Yes to 5 - 8 questions  or Yes to 2 or more of 4 STOP questions + male gender  or Yes to 2 or more of 4 STOP questions + BMI > 35kg/m2  or Yes to 2 or more of 4 STOP questions + neck circumference 17 inches / 43cm in male or 16 inches / 41cm in female    Weight History:  In what way is your excess weight affecting your wellness/health? Out of shape and low strength and endurance.  In teens played a lot of dance/dance revolution  (tournaments). Depression and family issues took over and put on a lot of weight 180-200 lbs up to current weight and stayed   Heaviest weight: current  Any Previous weight loss, what was the most lost and method: no  Birth weIight, High School graduation weight: 180  Lowest adult weight: 200  Maternal health/smoking/weight: na  When did you start gaining weight and what were the circumstances? See above  Is anyone else in your immediate family overweight? Mother.   Finally,  Is there a weight you desire to be, what is your goal weight that would define successful weight loss for you? 200 lbs.   I would like to lose weight so I can  :  Do some sort of physical labor.   .     Barriers to weight loss:  Is anyone else at home/work/family a barrier to your weight loss? No. Fiance is supportive.  Work schedule/location? See above  Current stressors include: work.  Mobility problems: no    Counseled on health benefits of weight loss, goals for metabolic/diabetic risk reduction, HTN reduction, improved sleep and mobility, cancer reduction, longevity.    Fitness History:  Were you ever an athlete?no     Which sports? na  When was the last time you walked/hiked a mile? Month ago.  Do you have any limitations for activity?fatigue.  Do you have access to a gym, pool, club, fitness center, or ?pool and exercise room in his apartment.                Do you have any fitness goals/dreams that could motivate your weight loss?wants to be able to do anything physical    On a scale from 0-10,  how willing are you to start some sort of movement/exercise regimen? Ready.    Counseled on physiologic benefits of exercise and for long term weight maintenance, goal working towards 200-300 minutes weekly.     Food History:  See above    Food sensitivities/allergies/intolerances/avoidances: minor pine nuts allergy. Apples cause itchy throat.   Water per day? 70-80 oz water.    Any binging behavior?no  Any induced vomiting/purging?  "no  Any history of anorexia/bulimia? no  Any night eating issues? no  Stress induced eating? Yes.    Goal Setting:  Short Term Goals: 290 lbs  Long Term Goals: 260 lbs         Patient Profile   Social History     Social History Narrative    Lives with his Andie nava.  Works at [employer].  HS graduate some college.       Family History   Problem Relation Age of Onset     Colon cancer Mother 50     Obesity Mother      Arthritis Mother      Hyperlipidemia Mother      Kidney cancer Father      Cancer Father      No Medical Problems Sister      Cholesteatoma Brother      Hyperlipidemia Maternal Uncle      Hypertension Maternal Uncle      Obesity Maternal Grandmother      Hyperlipidemia Maternal Grandmother      Hypertension Maternal Grandmother      Asthma Maternal Grandmother           Past Medical History   Patient Active Problem List   Diagnosis     Obesity     Personal history of kidney stones     Review of patient's allergies indicates no known allergies.  Current Outpatient Prescriptions   Medication Sig Note     buPROPion (WELLBUTRIN XL) 150 MG 24 hr tablet TAKE 1 TABLET BY MOUTH EVERY MORNING      loratadine (CLARITIN) 10 mg tablet Take 10 mg by mouth daily.      fluticasone (FLOVENT HFA) 220 mcg/actuation inhaler 2 puffs swallowed AM and PM (Patient taking differently: Inhale 2 puffs 2 (two) times a day. ) 5/15/2017: For allergies.     naltrexone (DEPADE) 50 mg tablet Days 0-10: Half a tablet in the am. Days 11 and up: Half tablet in am and half tablet in pm. Do not mix with narcotics.        Past Surgical History  He has a past surgical history that includes Cholecystectomy and Cystoscopy w/ ureteroscopy w/ lithotripsy (Left).     Examination   /71  Pulse 90  Resp 16  Ht 5' 10.5\" (1.791 m)  Wt (!) 322 lb (146.1 kg)  SpO2 96%  BMI 45.55 kg/m2  Height: 5' 10.5\" (1.791 m) (5/15/2017  2:41 PM)  Initial Weight: 322 lbs (5/15/2017  2:41 PM)  Weight: 322 lb (146.1 kg) (5/15/2017  2:41 " "PM)  Weight loss from initial: 0 (5/15/2017  2:41 PM)  % Weight loss: 0 % (5/15/2017  2:41 PM)  BMI (Calculated): 45.5 (5/15/2017  2:41 PM)  SpO2: 96 % (5/15/2017  2:41 PM)  Waist Circumference (In): 56 Inches (5/15/2017  2:41 PM)  Hip Circumference (In): 51 Inches (5/15/2017  2:41 PM)  Neck Circumference (In): 18 Inches (5/15/2017  2:41 PM)  General:  Alert and ambulatory, no distress.  HEENT:  No conjunctival pallor, moist mucous Membranes, neck is supple.  Pulmonary:  Normal respiratory effort, no cough, no audible wheezes/crackles.  CV:  Regular rate and Rhythm, no murmurs, pulses 2 plus  Abdominal: obese, soft, nontender. Previous cholecystectomy.  Extremities: trace sock edema. No tremor. Normal gait.  Skin:  No pallor or jaundice. No acanthosis nigricans or skin tags on exposed skin  Pscyh/Mood: pleasant, initially a little anxious.                                    LABS: \"Reviewed recent labs done through his PCP, added PTH given his low D and stones history.     Last recorded labs include:  Lab Results   Component Value Date    WBC 8.2 04/05/2017    HGB 15.2 04/05/2017    HCT 46.1 04/05/2017    MCV 87 04/05/2017     04/05/2017      Lab Results   Component Value Date    FFGKBQPB46DR 16.8 (L) 04/05/2017    Lab Results   Component Value Date    HGBA1C 5.6 04/05/2017      Lab Results   Component Value Date    CHOL 220 (H) 04/05/2017    No results found for: PTH      No results found for: FERRITIN   Lab Results   Component Value Date    HDL 34 (L) 04/05/2017      No results found for: [id number] No results found for: 07677   Lab Results   Component Value Date    LDLCALC 152 (H) 04/05/2017    Lab Results   Component Value Date    TSH 2.20 04/05/2017    No results found for: FOLATE   Lab Results   Component Value Date    TRIG 168 (H) 04/05/2017    Lab Results   Component Value Date    ALT 69 (H) 04/05/2017    AST 28 04/05/2017    ALKPHOS 89 04/05/2017    BILITOT 0.5 04/05/2017    No results found for: " TESTOSTERONE     No components found for: CHOLHDL No results found for: 7597   @resusfast(vitamin a: 1)@       Other Notables/imaging:     Counselin minutes spent in direct consultation with the patient regarding conditions contributing to excess weight accumulation, with over 50% of the time spent in counseling, goal setting and initiating a plan to lose their excess weight.    Adam Galo MD  [facility].  5/15/2017

## 2021-06-10 NOTE — PROGRESS NOTES
"  Office Visit - Follow Up   Ronnie Hancock   32 y.o. male    Date of Visit: 4/19/2017    Chief Complaint   Patient presents with     Follow-up        Assessment and Plan   1. Anxiety  Continue Wellbutrin 150 mg daily    2. Vitamin D deficiency  Start vitamin D3 1000 units daily    3. Transaminitis  He will be seeing the weight loss clinic, likely related to fatty liver, repeat testing can be done at that time    4. Multiple allergies  Per Dr. Ma    The following high BMI interventions were performed this visit: encouragement to exercise and lifestyle education regarding diet    Return in about 6 months (around 10/19/2017) for recheck.     History of Present Illness   This 32 y.o. old man comes in for follow-up of numerous medical problems.  I started him on Wellbutrin for anxiety and depressed mood and this is helped significantly.  His productivity at work is improved in his interpersonal relationships have improved.  He saw her allergist.  Please refer to Dr. Isaac Ma note.  He has an upcoming appointment with urology as well as her weight loss clinic.  He has lost a couple of pounds.  We reviewed his labs as ordered last clinic including mild transaminitis, mild vitamin D deficiency, mild lipid abnormality    Review of Systems: A comprehensive review of systems was negative except as noted.     Medications, Allergies and Problem List   Reviewed and updated     Physical Exam   General Appearance:   No acute distress    /68 (Patient Site: Right Arm, Patient Position: Sitting, Cuff Size: Adult Large)  Pulse 88  Ht 5' 10.5\" (1.791 m)  Wt (!) 328 lb 12 oz (149.1 kg)  BMI 46.5 kg/m2         Additional Information   Current Outpatient Prescriptions   Medication Sig Dispense Refill     buPROPion (WELLBUTRIN XL) 150 MG 24 hr tablet Take 1 tablet (150 mg total) by mouth every morning. 30 tablet 2     fluticasone (FLOVENT HFA) 220 mcg/actuation inhaler 2 puffs swallowed AM and PM 1 Inhaler 12     " loratadine (CLARITIN) 10 mg tablet Take 10 mg by mouth daily.       No current facility-administered medications for this visit.      No Known Allergies  Social History   Substance Use Topics     Smoking status: Never Smoker     Smokeless tobacco: None     Alcohol use No       Review and/or order of clinical lab tests: Reviewed  Review and/or order of radiology tests:  Review and/or order of medicine tests:  Discussion of test results with performing physician:  Decision to obtain old records and/or obtain history from someone other than the patient:  Review and summarization of old records and/or obtaining history from someone other than the patient and.or discussion of case with another health care provider: Reviewed his allergy consultation as summarized  Independent visualization of image, tracing or specimen itself:    Time:      Simon Major MD
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
## 2021-06-10 NOTE — PROGRESS NOTES
[facility]   Initial Evaluation    Patient Name: Ronnie Hancock  Date of evaluation: 4/10/2017  PRECAUTIONS: none  Referral Diagnosis: Left knee pain; Shoulder impingement, right  Referring provider: Simon Major MD  Visit Diagnosis:     ICD-10-CM    1. Acute pain of right shoulder M25.511    2. Shoulder stiffness, right M25.611    3. Shoulder impingement, right M75.41    4. Chronic pain of left knee M25.562     G89.29    5. Generalized muscle weakness M62.81        Assessment:      Pt. is appropriate for skilled PT intervention as outlined in the Plan of Care (POC).  Pt. is a good candidate for skilled PT services to improve pain levels and function.  Signs/sx consistent with acute right shoulder pain, most consistent with impingement sx with possible mild rotator cuff tendonitis.   Pt also with chronic left knee joint pain, most likely biomechanical in nature due to excessive loading from obesity combined with LE weakness due to inactivity.    Goals:  Pt. will be independent with home exercise program in : 6 weeks  Pt. will have improved quality of sleep: waking less times/night;in 6 weeks;Comment  Comment:: able to lie on R side without increased pain  Patient will ascend / descend: stairs;with recipricol gait;in 6 weeks;with less pain  Patient will reach / maintain arm movement: overhead;behind;forward;with full ROM;with no pain;in 6 weeks  Patient will decrease : SPADI score;in 6 weeks;for improved quality of life;by _ points  by ___ points: >= 10    Patient's expectations/goals are realistic.    Barriers to Learning or Achieving Goals:  Obesity       Plan / Patient Instructions:        Plan of Care:   Authorization / Certification Number of Visits: 20 per HP  Communication with: Referral Source  Patient Related Instruction: Nature of Condition;Treatment plan and rationale;Self Care instruction;Basis of treatment;Body mechanics;Posture;Precautions;Next steps;Expected outcome  Times per  "Week: 1-2  Number of Weeks: 4-6  Number of Visits: 12  Precautions / Restrictions : none  Therapeutic Exercise: ROM;Stretching;Strengthening  Neuromuscular Reeducation: kinesio tape;posture;balance/proprioception  Manual Therapy: joint mobilization;soft tissue mobilization  Modalities: ultrasound (prn)    Plan for next visit: Review HEP, progressing scap retractor and shoulder external rotation strength as tolerated. Continue posterior and inferior glides (grade II>III as tolerated) to the R GH joint. Add further hip extensor and hip ABDuctor strengthening (bridges, standing hip ABD, mini squats as tolerated).     Subjective:       History of Present Illness:    Ronnie is a 32 y.o. male who presents to therapy today with complaints of acute right lateral shoulder and chronic left knee pain.   Onset: L knee pain is chronic, present for \"quite some time.\" Pt believes due to obesity and inactivity, and acknowledges family history of obesity and knee OA. R shoulder pain started more recently, Jan. 2017. Insidious and gradual onset.  Duration: Constant in the shoulder, Intermittent in the knee  Worse with going up and down stairs (left knee), reaching overhead, lying on the right shoulder  Better with holding arm close to body and not doing stairs.  Pain Medication: Denies pain medication use.  Sleep: reports waking 1-2x/night on average due to R shoulder pain, mostly if lying on the R side.    Pt seeks PT to \"stop arm/knee pain.\"    Pain Rating:3  Pain rating at best: 2  Pain rating at worst: 7  Pain description: aching, dull, pain, sharp, soreness and weakness     Objective:      Patient Outcome Measures :    Shoulder Pain and Disability Index (SPADI) in %: 36   Scores range from 0-100%, where a score of 0% represents minimal pain and maximal function. The minimal clincically important difference is a score reduction of 10%.    Examination  1. Acute pain of right shoulder     2. Shoulder stiffness, right     3. " Shoulder impingement, right     4. Chronic pain of left knee     5. Generalized muscle weakness       Precautions/Restrictions: None  Involved side: RIGHT shoulder, LEFT knee  Posture Observation:      General sitting posture is  fair.  General standing posture is normal.  Shoulder/Thoracic complex: Mild bilateral scapular protraction   Mildly increased upper thoracic kyphosis    Cervical clearing: WNL and non-provocative t/o.    Shoulder/Elbow ROM  Date:      Shoulder and Elbow ROM ( )   AROM in degrees AROM in degrees AROM in degrees    Right Left Right Left Right Left   Shoulder Flexion (0-180 ) Mild loss with mid>ERP WNL       Shoulder Abduction (0-180 ) Mild loss with mid>ERP WNL       Shoulder ABD/ER Hand to back of neck Hand to back of neck       Shoulder ER (0-90 )         Shoulder IR (0-70 ) Thumb to T10 and PF Thumb to T8       Shoulder IR/EXT         Elbow Flexion (150 )         Elbow Extension (0 )          PROM in degrees PROM in degrees PROM in degrees    Right Left Right Left Right Left   Shoulder Flexion (0-180 ) WNL        Shoulder Abduction (0-180 ) ~150* with ERP        Shoulder Extension (0-60 )         Shoulder ER (0-90 ) 90        Shoulder IR (0-70 ) 70        Elbow Flexion (150 )         Elbow Extension (0 )             Shoulder/Elbow Strength  Date:      Shoulder/Elbow Strength (/5)  Manual Muscle Test (MMT) MMT MMT MMT    Right Left Right Left Right Left   Shoulder Flexion 5 5       Supraspinatus         Shoulder Abduction 5 5       Shoulder Extension         Shoulder External Rotation 5 5       Shoulder Internal Rotation 5 5       Elbow Flexion 5 5       Elbow Extension 5 5       Other:         Other:             Shoulder Special Tests  Impingement Cluster Right (+/-) Left (+/-) Rotator Cuff Tests Right (+/-) Left (+/-)   March-Rylan   Drop Arm Sign - -   Painful Arc - - Hornblowers     Infraspinatus Test - - ERLS     AC Tests Right (+/-) Left (+/-) IRLS     Active Compression   Labral  Tests Right (+/-) Left (+/-)   Crossbody Adduction   Biceps Load Test II     AC Resisted Extension   Jerk Test     GH Instability Tests Right (+/-) Left (+/-) Meryl Test     Sulcus Sign   Biceps Tests Right (+/-) Left (+/-)   Apprehension - - Speed     Relocation   Mansoro salmeron     Other:   Other:     Other:   Other:       Palpation and Passive Accessory Joint Motion: Denies significant TTP. Hypomobility noted with R GH accessory motion testing.    Gait: WNL without obvious deviations.  Stairs: Requires significant A through handrail for ascending and descending stairs. Intermittent step-to pattern required for descending stairs due to pain.    Knee ROM:    Date:       Knee ROM ( ) AROM in degrees AROM in degrees AROM in degrees    Right Left Right Left Right Left   Knee Flexion (0-130 ) 120 120       Knee extension (0 ) 0 0        PROM in degrees PROM in degrees PROM in degrees    Right Left Right Left Right Left   Knee Flexion (0-130 )         Knee extension (0 )           Meniscal Tests Right (+/-) Left (+/-) Ligament Tests Right (+/-) Left (+/-)   Virgilio s   Lachman     Joint Line Tenderness  - Anterior Drawer  -   Thessaly   Posterior Drawer  -   Apley s   Posterior sag  -   Knee OA Right (+/-) Left (+/-) Valgus Stress  -   1. > 49 y/o  2. Stiffness > 30 minutes  3. Crepitus   4. Bony tenderness  5. Bone enlargement  6. No warmth to the touch   Varus Stress  -   Other Right (+/-) Left (+/-) Other     Ely s   Other     Chad          Knee Special Tests:      SLR: L 30*, R 40*.  Palpation and Passive Accessory Joint Motion: Denies TTP t/o L knee.      Treatment Today     TREATMENT MINUTES COMMENTS   Evaluation 25 R shoulder and L knee   Self-care/ Home management     Manual therapy 3 Supine R GH joint: grade II posterior and inferior glides to decrease pain and increase ROM   Neuromuscular Re-education     Therapeutic Activity     Therapeutic Exercises 27 -Discussed diagnoses, prognosis, POC, relevant anatomy and  basis for tx.  -Reviewed and performed HEP with handouts provided.   Gait training     Modality__________________                Total 55    Blank areas are intentional and mean the treatment did not include these items.            PT Evaluation Code: (Please list factors)  Patient History/Comorbidities: obesity  Examination: R shoulder, L knee  Clinical Presentation: Stable  Clinical Decision Making: Low    Patient History/  Comorbidities Examination  (body structures and functions, activity limitations, and/or participation restrictions) Clinical Presentation Clinical Decision Making (Complexity)   No documented Comorbidities or personal factors 1-2 Elements Stable and/or uncomplicated Low   1-2 documented comorbidities or personal factor 3 Elements Evolving clinical presentation with changing characteristics Moderate   3-4 documented comorbidities or personal factors 4 or more Unstable and unpredictable High           Addy Humphreys  4/10/2017  3:45 PM    [facility] Discharge Summary  Patient Name: Ronnie Hancock  Date: 5/24/2017  Referral Diagnosis: Left knee pain; Shoulder impingement, right  Referring provider: Simon Major MD  Visit Diagnosis:   1. Acute pain of right shoulder     2. Shoulder stiffness, right     3. Shoulder impingement, right     4. Chronic pain of left knee     5. Generalized muscle weakness         Goals: *Unable to comment on completion of goals due to lack of further follow-up with PT.  Pt. will be independent with home exercise program in : 6 weeks  Pt. will have improved quality of sleep: waking less times/night;in 6 weeks;Comment  Comment:: able to lie on R side without increased pain  Patient will ascend / descend: stairs;with recipricol gait;in 6 weeks;with less pain  Patient will reach / maintain arm movement: overhead;behind;forward;with full ROM;with no pain;in 6 weeks  Patient will decrease : SPADI score;in 6 weeks;for improved quality of life;by _  points  by ___ points: >= 10    Patient was seen for initial evaluation and treatment on 04/10/17 only.   Patient received a home program .  The patient discontinued therapy, did not return.    Therapy will be discontinued at this time.  The patient will need a new referral to resume.    Thank you for your referral.  Addy Humphreys  5/24/2017  8:57 AM

## 2021-06-10 NOTE — PROGRESS NOTES
Pt here to establish care and medication management.    Depression PHQ-9 Score 7 somewhat difficult  Anxiety LUCILA-7 Score 6 somewhat difficult  Mood- current score 3 not difficult at all.    Does have sleep issues getting about 6 hours nightly.    Denies SI/HI thoughts at this time.     [place]  below:  None found
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
## 2021-06-10 NOTE — PROGRESS NOTES
Correct pharmacy verified with patient and confirmed in snapshot? [x] yes []no    Medications Phoned  to Pharmacy [] yes [x]no  Name of Pharmacist:  List Medications, including dose, quantity and instructions      Medication Prescriptions given to patient   [] yes  [x] no   List the name of the drug the prescription was written for.       Medications ordered this visit were e-scribed.  Verified by order class [x] yes  [] no   Wellbutrin XL, Melatonin OTC pt instructed to purchase  Medication changes or discontinuations were communicated to patient's pharmacy: [] yes  [x] no    UA collected [] yes    [x] no    [place] Prescription Monitoring Program Reviewed? [x] yes  [] no    Referrals were made to:  Sleep medicine    Future appointment was made: [x] yes  [] no    Dictation completed at time of chart check: [x] yes  [] no    I have checked the documentation for today s encounters and the above information has been reviewed and completed.

## 2021-06-11 NOTE — PROGRESS NOTES
Non-surgical Weight Loss Initial Diet Evaluation     Assessment:  Pt is a 32 y.o. male being seen today for non-surgical RD nutritional evaluation. Today we reviewed current eating habits and level of physical activity, and instructed on the changes that are required for successful weight loss outcomes.    Personal Goals: Pt desires to lose weight and financee is also trying to lose weight together. Pt wants to build up stamina. Pt used to play [employer] (dance video game).   Personal goal weight: 200 lb     Phentermine: Pt was prescribed naltrexone, however reports not using d/t price.     Pt's Initial Weight: 322 lbs  Weight: 322 lb 8 oz (146.3 kg)  Weight loss from initial: -0.5  % Weight loss: -0.16 %  BMI: Body mass index is 45.62 kg/(m^2).  IBW: 169 lbs    Estimated RMR (Bluejacket-St Jeor equation): 2424 calories  Protein requirements (.5grams to .9grams per pound IBW, 20-30% of calories, minimum of 60-80gm per day):   grams     Food allergies, intolerances, Taoism customs: none     Vitamins/Mineral Supplementation: none     Biggest struggle with weight loss: Sedentary lifestyle, reduced metabolism with age, lack of effort.     Who does the grocery shopping for your household? Pt and pt's taviae (live together in apartment)  Who prepares your meals at home? Both     Diet Recall/Time: Pt wakes up at 6:45pm  Breakfast: granola bar  Am Snack: granola bar   Lunch: (in cafeteria)- Hot entree: pulled pork sandwich with marinated vegetables and fries.   Pm snack: none   Dinner: meat, carb, vegetable   HS Snack: none     Typical Snacks: granola bars or soda      Fats used at home: butter, olive oil    Fried Foods: 4 times per week    Meals per week away from home: 5-6x/week    Sit down: none   Fast Food: 1x/week    Take Out: none   Delivery: none   Buffet: none   Cafeteria: Pt's work daily for lunch   Specialty Coffee: none   Ice Cream/FrozenYogurt/Bakery: none   Comments: n/a     Recommended limiting eating out to  no more than 2x/week.  Patient and I reviewed the importance of eating three consistent meals per day; as well as meal timing to be spaced 4-5 hours apart.  Snack choices: 100-150 calories (1-2x/day if physically hungry), incorporating a fruit/vegetable w/ protein source.    Portion Sizes problematic? yes per patient/diet recall  Encouraged slowing meal times down, 20-30 minutes, chewing to applesauce consistency.   To aid in proper portion control and slow meal time down discussed consuming meals off smaller plates, use toddler/children utensils and set utensils down after each bite.    Protein, vegetables/fruits, carbohydrates:   Reviewed lean protein sources today. Recommended consuming 20gm protein at 3 meals daily.  The patient and I discussed the importance of including lean/low fat protein at each meal and limiting carbohydrate intake to less than 25% of plate volume.     Beverages (Type/Oz. per day)  Water: 50-70 oz   Coffee: none   Tea: none   Milk: none   Regular soda: none   Diet soda: none   Juice: none   Sreedhar-Aid/lemonade/etc: none   Alcohol: none     Discussed the importance of adequate hydration and the goal of 64+ oz of fluid daily.   The patient understands the importance of avoiding all alcoholic and sweetened drinks, and instead choosing 64 oz plain water.    Exercise  Pt attends apartment gym for 3x/week for 30 minutes.     Pt's understands that 45-60 minutes of daily activity is an important part of weight loss success.   Encouraged pt to incorporate upper body strength training exercise, even if its lifting soup cans while watching tv at night, doing push ups/sit-ups, and abdominal work.    PES statement:    1. (NI-1.3)Excessive energy intake related to Food and nutrition related knowledge deficit concerning excessive energy/oral intake as evidenced by Intake of high caloric density foods at meals and/or snacks; large portions; frequent grazing; Estimated intake that exceeds estimated daily  energy intake; Frequent excessive fast food or restaurant intake; and BMI 45.    Intervention  Discussion:  1. Educated pt on Eat Better, Move More, Live Well: Non-surgical Weight Loss Handout  2. Recommended to consume 20gm protein at 3 meals daily. 60-80 grams daily total.  3. Educated pt on food labels: keeping total fat grams <10, total sugar grams <10, fiber >3gm per serving.   4. Discussed protein supplements and various breakfast recipes with protein  5. Plate Method: The patient and I discussed the importance of including lean/low  fat protein at each meal and limiting carbohydrate intake to less  than 25% of plate volume.    Instructions/Goals:   1. Include 20gm protein at each meal.  2. Increase vegetable/fruit intake, by having a vegetable or fruit with each meal daily. Recommended pt to increase vegetable/fruit intake to 4-5 servings daily.  3. Increase fluid intake to 64oz daily: choose plain or calorie/alcohol-free beverages.  4. Incorporate daily structured activity, 45-60 minutes most days of the week  5. Read food labels more consistently: keeping total fat grams <10, total sugar grams <10, fiber >3gm per serving.  6. Practice plate method: 1/2 plate lean/low fat protein source, vegetable/fruit, <25% of plate complex carbohydrates.  7. Practice eating off of smaller plates/bowls, chewing to applesauce consistency, taking 20-30 minutes to eat in a calm/relaxed environment without distractions of tv/email/cell phone.    Goals set by patient:  1. Aim for 20g of protein at each meal  2. Increase exercise routine to 4x/week at gym for 30 minutes     Handouts Provided:  Eat Better, Move More, Live Well: Non-surgical Weight Loss Handout  Protein Supplement List    Monitor/Evaluation:    Pt will f/u in one month with bariatrician and RD.     Plan for next visit with RD:    Plate method and give food journal homework.  Review carbohydrates/fiber  Exercise      Time In: 7:30am  Time Out: 8:15am      ABN  signed: Yes

## 2021-06-11 NOTE — PROGRESS NOTES
Mental Health Visit Note    6/19/2017    Start time: 810    Stop Time: 855   Session # 3    Ronnie Hancock is a 32 y.o. male is being seen today for    Chief Complaint   Patient presents with      Follow Up     Anxiety     Depression     New symptoms or complaints: depressed mood    Functional Impairment:   Personal: 2  Family: 3  Work: 3  Social:3    Clinical assessment of mental status: The patient was 10 minutes late for their scheduled session.  They were appropriately dressed with good grooming and hygiene.  The patient was oriented X3.  Patient was pleasant and cooperative.  Mood and affect were anxious and depressed and congruent with the content of his speech.   The patient made appropriate eye contact.  Recent and remote memories were intact.  Thought process was logical and linear.  Speech/language (tone and rate) were normal.  Insight and judgment were adequate.    Suicidal/Homicidal Ideation present: None Reported This Session    Patient's impression of their current status:  Patient comes to session reviewing how his depressed mood is getting in the way of his work and relationship.  He reviews interpersonal history with parents and how his anxiety developed he believes.     Therapist impression of patients current state: Patient's thoughts/feelings were explored and validated.  MI was used to join patient in his current state and to explore how he has addressed his symptoms before.  Patient was engaged in explanation of CBT and was agreeable to keeping track of his mood over the next 2 weeks with the provided mood log.      Type of psychotherapeutic technique provided: Client centered, CBT and MI    Progress toward short term goals:Progress as expected, as evidenced by attending session and engaging in exploration of problem/history.     Review of long term goals: Date of last review 5/16/2017    Diagnosis:   1. Major depressive disorder, recurrent episode, moderate        Plan and Follow up: 1.   Patient to schedule for continued follow up psychotherapy appointments.    2.  Patient will use their crisis plan and/or access crisis services should symptoms       become worse.      3.  Patient to remain medication compliant.  4.  Patient to abstain from drugs and alcohol.  5.  Complete mood log.    Discharge Criteria/Planning: Patient will continue with follow-up until therapy can be discontinued without return of signs and symptoms.    Osvaldo Finch 6/19/2017      This note was created with help of Dragon dictation software. Grammatical / typing errors are not intentional and inherent to the software.

## 2021-06-12 NOTE — PROGRESS NOTES
Dear Ruby Harley, NP  [address] [address],    Thank you for the opportunity to participate in the care of Ronnie Hancock.     He is a 32 y.o. y/o male patient who comes to the sleep medicine clinic for consultation.    We had an extensive conversation to review the results of his sleep study.    The overnight polysomnography was completed with a digital sleep system using the international 10-20 electrode placement for recording EEG, EOG, EMG from chin, ECG, respiratory effort, oximetry, body position, airflow, nasal pressure, snoring sound, pulse rate and limb movement channels.    The study was completed as a split night study.    During the diagnostic portion of the study      Respiratory monitoring showed moderate obstructive sleep apnea/hypopnea (AHI=29.8 events per hour with the lowest O2 Sat of 79%)     Respiratory events were more severe during stage REM sleep.     The patient spent a total of 7.7 minutes at an oxygen saturation below 88%.     During the titration portion of the study     CPAP pressures from 4 to 12 cwp were sampled during the night.     An effective CPAP pressure was not identified due to persistent central respiratory events.     Central AHI  was >5 and more than 50% of the total AHI during the titration portion of the study.     There was also evidence of limb movements during sleep independent of respiratory events.    We reviewed the oxygen saturation graph as well as the result tables from the report.        Past Medical History:   Diagnosis Date     Cholelithiasis      Depression     on bupropion, doing well now. therapy as well      Environmental allergies     Cat dander, dust mites, pine nuts, ragweed.     Eosinophilic esophagitis     EGD in 2015     Kidney stones        Past Surgical History:   Procedure Laterality Date     CHOLECYSTECTOMY       CYSTOSCOPY W/ URETEROSCOPY W/ LITHOTRIPSY Left        Social History     Social History     Marital status: Single      Spouse name: N/A     Number of children: N/A     Years of education: N/A     Occupational History     Not on file.     Social History Main Topics     Smoking status: Passive Smoke Exposure - Never Smoker     Smokeless tobacco: Not on file     Alcohol use No     Drug use: No     Sexual activity: Yes     Partners: Female     Other Topics Concern     Not on file     Social History Narrative    Lives with his Andie nava.  Works at [employer].  HS graduate some college.         Family History   Problem Relation Age of Onset     Colon cancer Mother 50     Obesity Mother      Arthritis Mother      Hyperlipidemia Mother      Sleep apnea Mother      Snoring Mother      Kidney cancer Father      Cancer Father      No Medical Problems Sister      Cholesteatoma Brother      Sleep apnea Brother      Hyperlipidemia Maternal Uncle      Hypertension Maternal Uncle      Obesity Maternal Grandmother      Hyperlipidemia Maternal Grandmother      Hypertension Maternal Grandmother      Asthma Maternal Grandmother          Review of Systems:  General: No weight gain, no weight loss  Eyes: No vision changes  ENT: No hearing changes  Cardio: No chest pain, no nocturnal dyspnea  Respiratory: No shortness of breath, no cough  Gastrointestinal: No diarrhea, no constipation  Genitourinary: No excessive urination  Tegumentary: No rashes  Neurological: No seizures, no loss of consciousness  Endo: No heat or cold intolerance.    Current Outpatient Prescriptions   Medication Sig Dispense Refill     buPROPion (WELLBUTRIN XL) 150 MG 24 hr tablet TAKE 1 TABLET BY MOUTH EVERY MORNING 90 tablet 2     fluticasone (FLOVENT HFA) 220 mcg/actuation inhaler 2 puffs swallowed AM and PM (Patient taking differently: Inhale 2 puffs 2 (two) times a day. ) 1 Inhaler 12     loratadine (CLARITIN) 10 mg tablet Take 10 mg by mouth daily.       melatonin 3 mg Tab tablet Take 1 tablet (3 mg total) by mouth at bedtime as needed. 30 tablet 2      "naltrexone (DEPADE) 50 mg tablet Days 0-10: Half a tablet in the am. Days 11 and up: Half tablet in am and half tablet in pm. Do not mix with narcotics. 60 tablet 0     No current facility-administered medications for this visit.        No Known Allergies    Physical Exam:  /64  Pulse (!) 104  Ht 5' 10.5\" (1.791 m)  Wt (!) 316 lb (143.3 kg)  SpO2 96%  BMI 44.7 kg/m2  BMI:Body mass index is 44.7 kg/(m^2).   GEN: NAD, obese  Head: Normocephalic.  EYES: PERRLA, EOMI  ENT: Oropharynx is clear, Mallampatti class IV airway.   Uvula is edematous  Nasal mucosa is pink  Neck: Thyroid is not palpable  CV: Regular rate and rhythm, S1 & S2 are normal. No murmurs  Neurological: Alert, oriented to time, place, and person.  Psych: normal mood, normal affect     Labs/Studies:  - We reviewed the results of the overnight PSG as described on the HPI.     Lab Results   Component Value Date    WBC 8.2 04/05/2017    HGB 15.2 04/05/2017    HCT 46.1 04/05/2017    MCV 87 04/05/2017     04/05/2017         Chemistry        Component Value Date/Time     04/05/2017 0844    K 4.1 04/05/2017 0844     04/05/2017 0844    CO2 26 04/05/2017 0844    BUN 9 04/05/2017 0844    CREATININE 0.94 04/05/2017 0844    GLU 89 04/05/2017 0844        Component Value Date/Time    CALCIUM 9.3 04/05/2017 0844    ALKPHOS 89 04/05/2017 0844    AST 28 04/05/2017 0844    ALT 69 (H) 04/05/2017 0844    BILITOT 0.5 04/05/2017 0844             No results found for: FERRITIN    Lab Results   Component Value Date    HGBA1C 5.6 04/05/2017       Assessment and Plan:  In summary Ronnie Hancock is a 32 y.o. year old male here for consultation.  1. Obstructive Sleep Apnea  We had an extensive conversation to review the results of his sleep study and to  him on the importance of treating sleep apnea.   We talked about whether we can do a repeat titration vs APAP.  There was evidence of obstruction with the highest CPAP pressure.   We will order " a CPAP device in auto mode.  He will start using the device as soon as he receives it with the intention to use if for the entire night.  We discussed some tips to increase PAP tolerance as well as the normal curve of adaptation. CPAP is going to provide improved respiratory function during the night but it can cause some sleep disruption that tends to improve with continuous usage.  He should return to the clinic in 10 weeks to review compliance and efficacy monitoring.  We talked about insurance requirements and I encourage the patient to learn the specific details of his health insurance plan regarding durable medical equipment.    Patient verbalized understanding of these issues, agrees with the plan and all questions were answered today. Patient was given an opportuntity to voice any other symptoms or concerns not listed above. Patient did not have any other symptoms or concerns.        MD DEQUAN Nunez Board Certified in Internal Medicine and Sleep Medicine  [facility].

## 2021-06-12 NOTE — PROGRESS NOTES
Patient was offered choice of vendor and chose [facility].  Patient Ronnie Hancock was set up at [facility] on 8/24/17. Patient received a [employer]med [id number] Auto. Pressures were set at 5-20 CM H2O.   Patient s ramp is 5 cm H2O for Auto and FLEX/EPR is EPR.  Patient received a Martinez [employer] m2fx Mask name: Simplus  FFM Size Medium, Heated tubing and heated humidifier.    Jake Her

## 2021-06-15 NOTE — PROGRESS NOTES
"  Office Visit - Follow Up   Ronnie Hancock   32 y.o. male    Date of Visit: 1/5/2018    Chief Complaint   Patient presents with     Cough     Fatigue        Assessment and Plan   1. Influenza-like illness  This seems to be improving, presentation consistent with resolving influenza a.  Recommended vaccination next year.  No indication for Tamiflu.  A prescription for azithromycin was given in the event that he does not improve for the next 2-3 days    2. Major depressive disorder, recurrent episode, moderate  Mood seems to be stable continue current plan    3. NA (obstructive sleep apnea)  There is him to use his CPAP machine    4. Obesity, Class III, BMI 40-49.9 (morbid obesity)  The following high BMI interventions were performed this visit: encouragement to exercise and lifestyle education regarding diet    Return in about 4 weeks (around 2/2/2018) for recheck.     History of Present Illness   This 32 y.o. old man comes in for evaluation of fever, cough, shortness of breath.  This started last Saturday.  It came on suddenly.  Coughing is been quite intense.  Fever is resolved.  Body aches have resolved.  Cough seems a bit better today than it had been.  Overall seems to be improving.  No nausea vomiting or diarrhea.  No sick contacts.  We reviewed his sleep study, obstructive sleep apnea, not using CPAP.  Mood seems to be stable.  He has also met with our bariatric program.    Review of Systems: A comprehensive review of systems was negative except as noted.     Medications, Allergies and Problem List   Reviewed and updated     Physical Exam   General Appearance:   No acute distress    /80 (Patient Site: Left Arm, Patient Position: Sitting, Cuff Size: Adult Regular)  Pulse 88  Ht 5' 10.5\" (1.791 m)  Wt (!) 312 lb (141.5 kg)  SpO2 98%  BMI 44.13 kg/m2    HEENT exam is unremarkable  Neck supple no thyromegaly or nodule palpable  Lymphatic no cervical lymphadenopathy  Cardiovascular regular rate and " rhythm no murmur gallop or rub  Pulmonary lungs are clear to auscultation bilaterally  Gastrointestinall abdomen soft nontender nondistended no organomegaly  Neurologic exam is non focal  Psychiatric pleasant, no confusion or agitation        Additional Information   Current Outpatient Prescriptions   Medication Sig Dispense Refill     buPROPion (WELLBUTRIN XL) 150 MG 24 hr tablet TAKE 1 TABLET BY MOUTH EVERY MORNING 90 tablet 2     loratadine (CLARITIN) 10 mg tablet Take 10 mg by mouth daily.       azithromycin (ZITHROMAX) 250 MG tablet Take 500mg (2 tablets) day one, and then 250mg (1 tablet) days 2-5 6 tablet 0     No current facility-administered medications for this visit.      No Known Allergies  Social History   Substance Use Topics     Smoking status: Passive Smoke Exposure - Never Smoker     Smokeless tobacco: None     Alcohol use No       Review and/or order of clinical lab tests:  Review and/or order of radiology tests:  Review and/or order of medicine tests:  Discussion of test results with performing physician:  Decision to obtain old records and/or obtain history from someone other than the patient:  Review and summarization of old records and/or obtaining history from someone other than the patient and.or discussion of case with another health care provider:  Independent visualization of image, tracing or specimen itself:    Time:      Simon Major MD

## 2021-06-16 NOTE — PROGRESS NOTES
"  Office Visit - Follow Up   Ronnie Hancock   33 y.o. male    Date of Visit: 3/12/2018    Chief Complaint   Patient presents with     Cough     on going for 1 week        Assessment and Plan   1. Upper respiratory infection  Upper respiratory infection with persistent cough.  Will treat with Z-Agusto along with Robitussin-AC    No Follow-up on file.     History of Present Illness   This 33 y.o. old male is here with persistent cough for over 1 week.  No history of asthma.  Denies any dyspnea.  Cough is keeping him awake at night.  There is some sore throat.  No body aches or headache.  No fevers or chills.    Review of Systems: No chest pain no nausea     Medications, Allergies and Problem List   Patient Active Problem List   Diagnosis     Obesity, Class III, BMI 40-49.9 (morbid obesity)     Personal history of kidney stones     Major depressive disorder, recurrent episode, moderate     Grief     NA (obstructive sleep apnea)       He has a past surgical history that includes Cholecystectomy and Cystoscopy w/ ureteroscopy w/ lithotripsy (Left).    No Known Allergies    Current Outpatient Prescriptions   Medication Sig Dispense Refill     buPROPion (WELLBUTRIN XL) 150 MG 24 hr tablet TAKE 1 TABLET BY MOUTH EVERY MORNING 90 tablet 2     loratadine (CLARITIN) 10 mg tablet Take 10 mg by mouth daily.       azithromycin (ZITHROMAX Z-AGUSTO) 250 MG tablet Take 2 tablets (500 mg) on  Day 1,  followed by 1 tablet (250 mg) once daily on Days 2 through 5. 6 tablet 0     codeine-guaiFENesin (GUAIFENESIN AC)  mg/5 mL liquid Take 5-10 mL by mouth 4 (four) times a day as needed for cough. 120 mL 0     No current facility-administered medications for this visit.         Physical Exam   General Appearance:   Obese young male in no distress    /64  Pulse 74  Ht 5' 10.5\" (1.791 m)  Wt (!) 315 lb (142.9 kg)  SpO2 98%  BMI 44.56 kg/m2    HEENT: Normal, TMs normal, oropharynx normal  Neck without " lymphadenopathy  Respiratory: Normal respiratory effort.  Lungs are clear with no rales or wheezes.  Heart: Regular rate and rhythm          Additional Information   Social History   Substance Use Topics     Smoking status: Passive Smoke Exposure - Never Smoker     Smokeless tobacco: Never Used     Alcohol use No              Jaycob Frias MD

## 2021-06-16 NOTE — TELEPHONE ENCOUNTER
RN cannot approve Refill Request    RN can NOT refill this medication Protocol failed and NO refill given. Last office visit: 5/3/2019 Simon Major MD Last Physical: 4/5/2017 Last MTM visit: Visit date not found Last visit same specialty: 5/3/2019 Simon Major MD.  Next visit within 3 mo: Visit date not found  Next physical within 3 mo: Visit date not found      Arnoldo Yao, [facility] Triage/Med Refill 3/18/2021    Requested Prescriptions   Pending Prescriptions Disp Refills     buPROPion (WELLBUTRIN XL) 150 MG 24 hr tablet [Pharmacy Med Name: BUPROPION HCL  MG TABLET] 90 tablet 3     Sig: TAKE ONE TABLET BY MOUTH ONCE DAILY EVERY MORNING       Tricyclics/Misc Antidepressant/Antianxiety Meds Refill Protocol Failed - 3/17/2021 12:26 AM        Failed - PCP or prescribing provider visit in last year     Last office visit with prescriber/PCP: 5/3/2019 Simon Major MD OR same dept: Visit date not found OR same specialty: 5/3/2019 Simon Major MD  Last physical: 4/5/2017 Last MTM visit: Visit date not found   Next visit within 3 mo: Visit date not found  Next physical within 3 mo: Visit date not found  Prescriber OR PCP: Simon Major MD  Last diagnosis associated with med order: 1. MDD (major depressive disorder), recurrent episode, moderate (H)  - buPROPion (WELLBUTRIN XL) 150 MG 24 hr tablet [Pharmacy Med Name: BUPROPION HCL  MG TABLET]; TAKE ONE TABLET BY MOUTH ONCE DAILY EVERY MORNING  Dispense: 90 tablet; Refill: 3    If protocol passes may refill for 12 months if within 3 months of last provider visit (or a total of 15 months).

## 2021-07-14 PROBLEM — F41.8 MIXED ANXIETY AND DEPRESSIVE DISORDER: Status: RESOLVED | Noted: 2017-05-01 | Resolved: 2017-05-01

## 2021-09-12 ENCOUNTER — HEALTH MAINTENANCE LETTER (OUTPATIENT)
Age: 36
End: 2021-09-12

## 2021-10-07 ENCOUNTER — APPOINTMENT (OUTPATIENT)
Dept: RADIOLOGY | Facility: HOSPITAL | Age: 36
End: 2021-10-07
Payer: COMMERCIAL

## 2021-10-07 VITALS
HEART RATE: 98 BPM | WEIGHT: 315 LBS | DIASTOLIC BLOOD PRESSURE: 78 MMHG | BODY MASS INDEX: 45.1 KG/M2 | TEMPERATURE: 97.4 F | OXYGEN SATURATION: 96 % | SYSTOLIC BLOOD PRESSURE: 108 MMHG | RESPIRATION RATE: 20 BRPM | HEIGHT: 70 IN

## 2021-10-07 PROCEDURE — 73090 X-RAY EXAM OF FOREARM: CPT | Mod: RT

## 2021-10-07 PROCEDURE — 73610 X-RAY EXAM OF ANKLE: CPT | Mod: LT

## 2021-10-07 PROCEDURE — 99285 EMERGENCY DEPT VISIT HI MDM: CPT | Mod: 25

## 2021-10-07 PROCEDURE — 24650 CLTX RDL HEAD/NCK FX WO MNPJ: CPT | Mod: RT

## 2021-10-07 PROCEDURE — 71046 X-RAY EXAM CHEST 2 VIEWS: CPT

## 2021-10-07 RX ORDER — ACETAMINOPHEN 325 MG/1
650 TABLET ORAL ONCE
Status: DISCONTINUED | OUTPATIENT
Start: 2021-10-07 | End: 2021-10-08 | Stop reason: HOSPADM

## 2021-10-07 ASSESSMENT — MIFFLIN-ST. JEOR: SCORE: 2528.84

## 2021-10-08 ENCOUNTER — HOSPITAL ENCOUNTER (EMERGENCY)
Facility: HOSPITAL | Age: 36
Discharge: HOME | End: 2021-10-08
Attending: EMERGENCY MEDICINE | Admitting: EMERGENCY MEDICINE
Payer: COMMERCIAL

## 2021-10-08 DIAGNOSIS — S93.402A SPRAIN OF LEFT ANKLE, UNSPECIFIED LIGAMENT, INITIAL ENCOUNTER: ICD-10-CM

## 2021-10-08 DIAGNOSIS — S20.211A RIB CONTUSION, RIGHT, INITIAL ENCOUNTER: ICD-10-CM

## 2021-10-08 DIAGNOSIS — S52.124A CLOSED NONDISPLACED FRACTURE OF HEAD OF RIGHT RADIUS, INITIAL ENCOUNTER: ICD-10-CM
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
## 2021-10-08 PROCEDURE — 250N000013 HC RX MED GY IP 250 OP 250 PS 637: Performed by: EMERGENCY MEDICINE

## 2021-10-08 RX ORDER — IBUPROFEN 600 MG/1
600 TABLET, FILM COATED ORAL ONCE
Status: COMPLETED | OUTPATIENT
Start: 2021-10-08 | End: 2021-10-08

## 2021-10-08 RX ORDER — HYDROCODONE BITARTRATE AND ACETAMINOPHEN 5; 325 MG/1; MG/1
1 TABLET ORAL EVERY 6 HOURS PRN
Qty: 10 TABLET | Refills: 0 | Status: SHIPPED | OUTPATIENT
Start: 2021-10-08 | End: 2021-10-11

## 2021-10-08 RX ORDER — IBUPROFEN 800 MG/1
800 TABLET, FILM COATED ORAL EVERY 8 HOURS PRN
Qty: 60 TABLET | Refills: 0 | Status: SHIPPED | OUTPATIENT
Start: 2021-10-08 | End: 2021-10-16

## 2021-10-08 RX ORDER — HYDROCODONE BITARTRATE AND ACETAMINOPHEN 5; 325 MG/1; MG/1
1 TABLET ORAL ONCE
Status: COMPLETED | OUTPATIENT
Start: 2021-10-08 | End: 2021-10-08

## 2021-10-08 RX ADMIN — IBUPROFEN 600 MG: 600 TABLET, FILM COATED ORAL at 01:37

## 2021-10-08 RX ADMIN — HYDROCODONE BITARTRATE AND ACETAMINOPHEN 1 TABLET: 5; 325 TABLET ORAL at 01:37
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
## 2022-01-02 ENCOUNTER — HEALTH MAINTENANCE LETTER (OUTPATIENT)
Age: 37
End: 2022-01-02

## 2022-10-30 ENCOUNTER — HEALTH MAINTENANCE LETTER (OUTPATIENT)
Age: 37
End: 2022-10-30

## 2023-04-08 ENCOUNTER — HEALTH MAINTENANCE LETTER (OUTPATIENT)
Age: 38
End: 2023-04-08

## 2024-06-09 ENCOUNTER — HEALTH MAINTENANCE LETTER (OUTPATIENT)
Age: 39
End: 2024-06-09